# Patient Record
Sex: FEMALE | Race: WHITE
[De-identification: names, ages, dates, MRNs, and addresses within clinical notes are randomized per-mention and may not be internally consistent; named-entity substitution may affect disease eponyms.]

---

## 2024-04-23 ENCOUNTER — HOSPITAL ENCOUNTER (OUTPATIENT)
Dept: HOSPITAL 99 - REG | Age: 79
End: 2024-04-23
Payer: MEDICARE

## 2024-04-23 DIAGNOSIS — J45.30: ICD-10-CM

## 2024-04-23 DIAGNOSIS — E78.00: ICD-10-CM

## 2024-04-23 DIAGNOSIS — R73.9: ICD-10-CM

## 2024-04-23 DIAGNOSIS — K21.9: Primary | ICD-10-CM

## 2024-04-23 DIAGNOSIS — E03.9: ICD-10-CM

## 2024-04-23 DIAGNOSIS — Z85.850: ICD-10-CM

## 2024-04-23 LAB
ALBUMIN SERPL-MCNC: 4.7 G/DL (ref 3.5–5)
ALP SERPL-CCNC: 67 U/L (ref 38–126)
ALT SERPL-CCNC: 12 U/L (ref 0–35)
AST SERPL-CCNC: 27 U/L (ref 14–36)
BUN SERPL-MCNC: 19 MG/DL (ref 7–17)
CALCIUM SERPL-MCNC: 10 MG/DL (ref 8.4–10.2)
CHLORIDE SERPL-SCNC: 103 MMOL/L (ref 98–107)
CO2 SERPL-SCNC: 27 MMOL/L (ref 22–30)
EGFR: > 60
GLUCOSE SERPL-MCNC: 90 MG/DL (ref 70–99)
HBA1C MFR BLD: 5.3 % (ref 4–5.6)
HDLC SERPL-MCNC: 131 MG/DL
LDLC SERPL CALC-MCNC: 115 MG/DL
POTASSIUM SERPL-SCNC: 4.5 MMOL/L (ref 3.5–5.1)
PROT SERPL-MCNC: 7.2 G/DL (ref 6.3–8.2)
SODIUM SERPL-SCNC: 137 MMOL/L (ref 135–145)
VLDLC SERPL CALC-MCNC: 18 MG/DL (ref 0–30)

## 2024-06-03 ENCOUNTER — HOSPITAL ENCOUNTER (OUTPATIENT)
Dept: HOSPITAL 99 - RAD | Age: 79
End: 2024-06-03
Payer: MEDICARE

## 2024-06-03 DIAGNOSIS — J32.9: Primary | ICD-10-CM

## 2024-06-14 ENCOUNTER — HOSPITAL ENCOUNTER (OUTPATIENT)
Dept: HOSPITAL 99 - REG | Age: 79
End: 2024-06-14
Payer: MEDICARE

## 2024-06-14 DIAGNOSIS — R35.0: Primary | ICD-10-CM

## 2024-07-18 ENCOUNTER — HOSPITAL ENCOUNTER (OUTPATIENT)
Dept: HOSPITAL 99 - RAD | Age: 79
End: 2024-07-18
Payer: MEDICARE

## 2024-07-18 DIAGNOSIS — M25.511: Primary | ICD-10-CM

## 2024-07-29 NOTE — PROGRESS NOTES
PT Evaluation     Today's date: 2024  Patient name: Ofelia Connor  : 1945  MRN: [id number]  Referring provider: Jennifer Raya,*  Dx:   Encounter Diagnosis     ICD-10-CM    1. Right shoulder pain, unspecified chronicity  M25.511                  Assessment  Impairments: abnormal or restricted ROM, abnormal movement, activity intolerance, impaired physical strength, lacks appropriate home exercise program, pain with function, poor posture  and poor body mechanics  Functional limitations: click seatbelt, lift overhead, reach backwards, cutting vegetables, disturbed sleep    Assessment details: Ofelia Connor is a 78 y.o. female who presents with pain, decreased strength, decreased ROM, and postural dysfunction. Due to these impairments, patient has difficulty performing ADL's, recreational activities, lifting/carrying, reaching. Patient's clinical presentation is consistent with their referring diagnosis of right shoulder pain. Patient has been educated in home exercise program and plan of care. Patient would benefit from skilled physical therapy services to address their aforementioned functional limitations and progress towards prior level of function and independence with home exercise program.      Prognosis: good  Prognosis details: + factors: high motivation levels  - factors: age     Goals  Short Term Goals to be accomplished by 24:  STG1: Pt will be I with HEP and I with posture management   STG3: Pt will demo pain free full shoulder flexion/abduction AROM to improve self care (showering) and household ADLs (opening car door).  STG4: Pt will demo 4+/5 MMT strength in shoulder to improve reach ability.   STG5: Pt will report pain 5/10 in shoulder at worst.      Long Term Goals to be accomplished by 10/22/24:  LTG1: Pt will be able to click her seat belt without shoulder pain.  LTG2: Pt will be able to lift weight overhead in exercise class without pain.  LTG3: Pt will be able to cut  vegetables for 20-30 minutes without shoulder pain.   LTG4: Pt will deny sleep disturbance due to shoulder pain at night.     Plan  Patient would benefit from: PT eval and skilled physical therapy  Planned modality interventions: cryotherapy, thermotherapy: hydrocollator packs and unattended electrical stimulation    Planned therapy interventions: home exercise program, therapeutic exercise, therapeutic activities, self care, patient education, manual therapy, neuromuscular re-education, joint mobilization, stretching, strengthening and flexibility    Frequency: 2x week  Duration in weeks: 12  Plan of Care beginning date: 2024  Plan of Care expiration date: 10/22/2024  Treatment plan discussed with: patient  Plan details: HEP development, stretching, strengthening, A/AA/PROM, joint mobilizations, posture education, STM/MI as needed to reduce muscle tension, muscle reeducation, PLOC discussed and agreed upon with patient.        Subjective Evaluation    History of Present Illness  Mechanism of injury: Pt is a 77 y/o female who presents to PT with R shoulder pain.  Pain began 4-6 weeks ago.  Has been treating with biofreeze with occasional relief, Advil w/o relief.  Went to her PCP who prescribed PT and x-ray ordered, xray resulst she was uncertain of.   L RTC tear histroy  Patient Goals  Patient goals for therapy: increased motion, decreased pain, increased strength and independence with ADLs/IADLs  Patient goal: click seatbelt, lift overhead, reach backwards, cutting vegetables, disturbed sleep  Pain  Current pain ratin  At best pain ratin  At worst pain ratin  Location: R shoulder  Quality: tight and discomfort  Relieving factors: relaxation and rest  Aggravating factors: overhead activity and lifting    Treatments  Current treatment: physical therapy      Objective     Postural Observations  Seated posture: poor  Standing posture: poor  Correction of posture: has no consistent  effect      Observations     Additional Observation Details  Shoulder level: forward shoulders b/l and forward head    Tenderness     Additional Tenderness Details  TTP over R RTC muscle belly     Active Range of Motion   Left Shoulder   Flexion: WFL  Abduction: WFL  External rotation 0°: WFL    Right Shoulder   Flexion: WFL  Abduction: WFL and with pain  External rotation 0°: WFL and with pain    Strength/Myotome Testing     Left Shoulder     Planes of Motion   Flexion: 4+   Abduction: 4+   External rotation at 0°: 4+   Internal rotation at 0°: 4+     Right Shoulder     Planes of Motion   Flexion: 4+   Abduction: 4-   External rotation at 0°: 4-   Internal rotation at 0°: 4-     Additional Strength Details  Tricep:  R: 4-   L: 4+    Bicep:  R: 4+  L: 4+    Tests     Right Shoulder   Positive Neer's and Speed's.     Additional Tests Details  Spurlings (-) b/l        Precautions:   Standard       Manuals 7/30                                            Neuro Re-Ed 7/30        Scapular retraction 10 sec x 5         Row isometric  GTB 10 sec x 5                                                      Ther Ex 7/30                                                                                Ther Activity 7/30

## 2024-07-30 ENCOUNTER — EVALUATION (OUTPATIENT)
Dept: PHYSICAL THERAPY | Facility: CLINIC | Age: 79
End: 2024-07-30
Payer: MEDICARE

## 2024-07-30 DIAGNOSIS — M25.511 RIGHT SHOULDER PAIN, UNSPECIFIED CHRONICITY: Primary | ICD-10-CM

## 2024-07-30 PROCEDURE — 97162 PT EVAL MOD COMPLEX 30 MIN: CPT | Performed by: PHYSICAL THERAPIST

## 2024-07-30 PROCEDURE — 97110 THERAPEUTIC EXERCISES: CPT | Performed by: PHYSICAL THERAPIST

## 2024-07-30 NOTE — LETTER
2024    Jennifer Raya PA-C  [address] [facility] [address]    Patient: Ofelia Connor   YOB: 1945   Date of Visit: 2024     Encounter Diagnosis     ICD-10-CM    1. Right shoulder pain, unspecified chronicity  M25.511           Dear Dr. Raya:    Thank you for your recent referral of Ofelia Connor. Please review the attached evaluation summary from Ofelia's recent visit.     Please verify that you agree with the plan of care by signing the attached order.     If you have any questions or concerns, please do not hesitate to call.     I sincerely appreciate the opportunity to share in the care of one of your patients and hope to have another opportunity to work with you in the near future.       Sincerely,    Nathanael Sharma, PT      Referring Provider:      I certify that I have read the below Plan of Care and certify the need for these services furnished under this plan of treatment while under my care.                    Jennifer Raya PA-C  [address] [facility] [address]  Via Fax: [phone]          PT Evaluation     Today's date: 2024  Patient name: Ofelia Connor  : 1945  MRN: [id number]  Referring provider: Jennifer Raya,*  Dx:   Encounter Diagnosis     ICD-10-CM    1. Right shoulder pain, unspecified chronicity  M25.511                  Assessment  Impairments: abnormal or restricted ROM, abnormal movement, activity intolerance, impaired physical strength, lacks appropriate home exercise program, pain with function, poor posture  and poor body mechanics  Functional limitations: click seatbelt, lift overhead, reach backwards, cutting vegetables, disturbed sleep    Assessment details: Ofelia Connor is a 78 y.o. female who presents with pain, decreased strength, decreased ROM, and postural dysfunction. Due to these impairments, patient has difficulty performing ADL's, recreational activities, lifting/carrying, reaching. Patient's  clinical presentation is consistent with their referring diagnosis of right shoulder pain. Patient has been educated in home exercise program and plan of care. Patient would benefit from skilled physical therapy services to address their aforementioned functional limitations and progress towards prior level of function and independence with home exercise program.      Prognosis: good  Prognosis details: + factors: high motivation levels  - factors: age     Goals  Short Term Goals to be accomplished by 8/27/24:  STG1: Pt will be I with HEP and I with posture management   STG3: Pt will demo pain free full shoulder flexion/abduction AROM to improve self care (showering) and household ADLs (opening car door).  STG4: Pt will demo 4+/5 MMT strength in shoulder to improve reach ability.   STG5: Pt will report pain 5/10 in shoulder at worst.      Long Term Goals to be accomplished by 10/22/24:  LTG1: Pt will be able to click her seat belt without shoulder pain.  LTG2: Pt will be able to lift weight overhead in exercise class without pain.  LTG3: Pt will be able to cut vegetables for 20-30 minutes without shoulder pain.   LTG4: Pt will deny sleep disturbance due to shoulder pain at night.     Plan  Patient would benefit from: PT eval and skilled physical therapy  Planned modality interventions: cryotherapy, thermotherapy: hydrocollator packs and unattended electrical stimulation    Planned therapy interventions: home exercise program, therapeutic exercise, therapeutic activities, self care, patient education, manual therapy, neuromuscular re-education, joint mobilization, stretching, strengthening and flexibility    Frequency: 2x week  Duration in weeks: 12  Plan of Care beginning date: 7/30/2024  Plan of Care expiration date: 10/22/2024  Treatment plan discussed with: patient  Plan details: HEP development, stretching, strengthening, A/AA/PROM, joint mobilizations, posture education, STM/MI as needed to reduce muscle  tension, muscle reeducation, PLOC discussed and agreed upon with patient.        Subjective Evaluation    History of Present Illness  Mechanism of injury: Pt is a 79 y/o female who presents to PT with R shoulder pain.  Pain began 4-6 weeks ago.  Has been treating with biofreeze with occasional relief, Advil w/o relief.  Went to her PCP who prescribed PT and x-ray ordered, xray resulst she was uncertain of.   L RTC tear histroy  Patient Goals  Patient goals for therapy: increased motion, decreased pain, increased strength and independence with ADLs/IADLs  Patient goal: click seatbelt, lift overhead, reach backwards, cutting vegetables, disturbed sleep  Pain  Current pain ratin  At best pain ratin  At worst pain ratin  Location: R shoulder  Quality: tight and discomfort  Relieving factors: relaxation and rest  Aggravating factors: overhead activity and lifting    Treatments  Current treatment: physical therapy      Objective     Postural Observations  Seated posture: poor  Standing posture: poor  Correction of posture: has no consistent effect      Observations     Additional Observation Details  Shoulder level: forward shoulders b/l and forward head    Tenderness     Additional Tenderness Details  TTP over R RTC muscle belly     Active Range of Motion   Left Shoulder   Flexion: WFL  Abduction: WFL  External rotation 0°: WFL    Right Shoulder   Flexion: WFL  Abduction: WFL and with pain  External rotation 0°: WFL and with pain    Strength/Myotome Testing     Left Shoulder     Planes of Motion   Flexion: 4+   Abduction: 4+   External rotation at 0°: 4+   Internal rotation at 0°: 4+     Right Shoulder     Planes of Motion   Flexion: 4+   Abduction: 4-   External rotation at 0°: 4-   Internal rotation at 0°: 4-     Additional Strength Details  Tricep:  R: 4-   L: 4+    Bicep:  R: 4+  L: 4+    Tests     Right Shoulder   Positive Neer's and Speed's.     Additional Tests Details  Spurlings (-) b/l         Precautions:   Standard       Manuals 8/1                                            Neuro Re-Ed 8/1        Scapular retraction 10 sec x 5         Row isometric  GTB 10 sec x 5                                                      Ther Ex 8/1                                                                                Ther Activity 8/1

## 2024-08-01 ENCOUNTER — OFFICE VISIT (OUTPATIENT)
Dept: PHYSICAL THERAPY | Facility: CLINIC | Age: 79
End: 2024-08-01
Payer: MEDICARE

## 2024-08-01 DIAGNOSIS — M25.511 RIGHT SHOULDER PAIN, UNSPECIFIED CHRONICITY: Primary | ICD-10-CM

## 2024-08-01 PROCEDURE — 97112 NEUROMUSCULAR REEDUCATION: CPT | Performed by: PHYSICAL THERAPIST

## 2024-08-01 PROCEDURE — 97110 THERAPEUTIC EXERCISES: CPT | Performed by: PHYSICAL THERAPIST

## 2024-08-01 PROCEDURE — 97140 MANUAL THERAPY 1/> REGIONS: CPT | Performed by: PHYSICAL THERAPIST

## 2024-08-01 NOTE — PROGRESS NOTES
"Daily Note     Today's date: 2024  Patient name: Ofelia Connor  : 1945  MRN: [id number]  Referring provider: Jennifer Raya,*  Dx:   Encounter Diagnosis     ICD-10-CM    1. Right shoulder pain, unspecified chronicity  M25.511              Subjective: Pt reported that noted that she has been focusing on her posture since IE. Noted that she has been working on scapular retraction, however \"I don't think I have it just yet.\"     Objective: See treatment diary below    Assessment: Tolerated treatment well. Patient demonstrated fatigue post treatment, exhibited good technique with therapeutic exercises, and would benefit from continued PT.    Plan: Continue per plan of care.      Precautions:   Standard       Manuals        STM RTC muscle belly  JZ       AP mob  JZ                         Neuro Re-Ed        Scapular retraction 10 sec x 5  10 sec x 10        Row isometric  GTB 10 sec x 5         Horiz abd sh/elbow 90 90  10 sec x 5                          Educated on posture  5'                Ther Ex        90 90 doorway stretch  :30x3 - v/c slow controlled motion, and cervical positioning        Supine sh flexion  Unable due to pain       Supine pec fly  Shallow ROM 2x10        Supine serratus punch  2x10 towel roll under elbow for rest in between sets        Supine punch/CP  0# 2x10  1# 2x10 (neutral to prontaed )   2# 2x10  towel roll under elbow for rest in between sets                                   Ther Activity                                                                                     "

## 2024-08-06 ENCOUNTER — OFFICE VISIT (OUTPATIENT)
Dept: PHYSICAL THERAPY | Facility: CLINIC | Age: 79
End: 2024-08-06
Payer: MEDICARE

## 2024-08-06 DIAGNOSIS — M25.511 RIGHT SHOULDER PAIN, UNSPECIFIED CHRONICITY: Primary | ICD-10-CM

## 2024-08-06 PROCEDURE — 97140 MANUAL THERAPY 1/> REGIONS: CPT | Performed by: PHYSICAL THERAPIST

## 2024-08-06 PROCEDURE — 97112 NEUROMUSCULAR REEDUCATION: CPT | Performed by: PHYSICAL THERAPIST

## 2024-08-06 PROCEDURE — 97110 THERAPEUTIC EXERCISES: CPT | Performed by: PHYSICAL THERAPIST

## 2024-08-06 NOTE — PROGRESS NOTES
Daily Note     Today's date: 2024  Patient name: Ofelia Connor  : 1945  MRN: [id number]  Referring provider: Jennifer Raya,*  Dx:   Encounter Diagnosis     ICD-10-CM    1. Right shoulder pain, unspecified chronicity  M25.511              Subjective: Pt reported that she felt a little increase in soreness following the last session, however she found a helpful topical ointment that has helped with pain control.     Objective: See treatment diary below    Assessment: Tolerated treatment well. Patient demonstrated fatigue post treatment, exhibited good technique with therapeutic exercises, and would benefit from continued PT    Plan: Continue per plan of care.      Precautions:   Standard       Manuals       STM RTC muscle belly  JZ KALIE      AP mob  KALIE JADE                        Neuro Re-Ed       Scapular retraction 10 sec x 5  10 sec x 10        Row isometric  GTB 10 sec x 5         Horiz abd sh/elbow 90 90  10 sec x 5        S/l sh ER   0# 10x  1# 2x10       S/l sh abduction   0# 10x   1# 10x  2# 10x      S/l sh flexion   0# 10x  1# 10x   2# 10x       Educated on posture  5'                Ther Ex       90 90 doorway stretch  :30x3 - v/c slow controlled motion, and cervical positioning        Supine sh flexion  Unable due to pain       Supine pec fly  Shallow ROM 2x10  0# 10x   1# 2x10  2# 2x10       Supine serratus punch  2x10 towel roll under elbow for rest in between sets  1# 2x10  2# 2x10        Supine punch/CP  0# 2x10  1# 2x10 (neutral to prontaed )   2# 2x10  towel roll under elbow for rest in between sets  1# 2x10   2# 2x10  3# 2x10   (neutral to prontaed )   towel roll under elbow for rest in between sets       Seated sh/elbow 90 90 - ER/IR db AROM - elbow on elevated HOB   0# 10x  1# 10x  2# 10x                         Ther Activity

## 2024-08-17 ENCOUNTER — HOSPITAL ENCOUNTER (OUTPATIENT)
Dept: HOSPITAL 99 - WDC | Age: 79
End: 2024-08-17
Payer: MEDICARE

## 2024-08-17 DIAGNOSIS — Z12.31: Primary | ICD-10-CM

## 2024-08-19 ENCOUNTER — APPOINTMENT (OUTPATIENT)
Dept: PHYSICAL THERAPY | Facility: CLINIC | Age: 79
End: 2024-08-19
Payer: MEDICARE

## 2024-08-21 ENCOUNTER — APPOINTMENT (OUTPATIENT)
Dept: PHYSICAL THERAPY | Facility: CLINIC | Age: 79
End: 2024-08-21
Payer: MEDICARE

## 2024-08-22 ENCOUNTER — APPOINTMENT (OUTPATIENT)
Dept: PHYSICAL THERAPY | Facility: CLINIC | Age: 79
End: 2024-08-22
Payer: MEDICARE

## 2024-08-26 ENCOUNTER — OFFICE VISIT (OUTPATIENT)
Dept: PHYSICAL THERAPY | Facility: CLINIC | Age: 79
End: 2024-08-26
Payer: MEDICARE

## 2024-08-26 DIAGNOSIS — M25.511 RIGHT SHOULDER PAIN, UNSPECIFIED CHRONICITY: Primary | ICD-10-CM

## 2024-08-26 PROCEDURE — 97110 THERAPEUTIC EXERCISES: CPT | Performed by: PHYSICAL THERAPIST

## 2024-08-26 PROCEDURE — 97112 NEUROMUSCULAR REEDUCATION: CPT | Performed by: PHYSICAL THERAPIST

## 2024-08-26 PROCEDURE — 97140 MANUAL THERAPY 1/> REGIONS: CPT | Performed by: PHYSICAL THERAPIST

## 2024-08-26 NOTE — PROGRESS NOTES
Daily Note     Today's date: 2024  Patient name: Ofelia Connor  : 1945  MRN: [id number]  Referring provider: Jennifer Raya,*  Dx:   Encounter Diagnosis     ICD-10-CM    1. Right shoulder pain, unspecified chronicity  M25.511                Subjective: Pt reported that she has been improving significantly since starting PT. Stated that her pain frequency and severity has been decreased significantly.     Objective: See treatment diary below    Assessment: Tolerated treatment well. Patient demonstrated fatigue post treatment, exhibited good technique with therapeutic exercises, and would benefit from continued PT    Plan: Continue per plan of care.      Precautions:   Standard       Manuals      STM RTC muscle belly  JZ JZ JZ     AP mob  JZ JZ JZ                       Neuro Re-Ed      Scapular retraction 10 sec x 5  10 sec x 10        Row isometric  GTB 10 sec x 5         Horiz abd sh/elbow 90 90  10 sec x 5        S/l sh ER   0# 10x  1# 2x10  1# 2x10   2# x10 pain     S/l sh abduction   0# 10x   1# 10x  2# 10x 1# 2x10   2# 2x10        S/l sh flexion   0# 10x  1# 10x   2# 10x  1# 2x10  2# 2x10      Overhead press    1# 2x10               Ther Ex      90 90 doorway stretch  :30x3 - v/c slow controlled motion, and cervical positioning        Supine sh flexion  Unable due to pain       Supine pec fly  Shallow ROM 2x10  0# 10x   1# 2x10  2# 2x10  1# 2x10  2# 2x10     Supine serratus punch  2x10 towel roll under elbow for rest in between sets  1# 2x10  2# 2x10   Held      Supine punch/CP  0# 2x10  1# 2x10 (neutral to prontaed )   2# 2x10  towel roll under elbow for rest in between sets  1# 2x10   2# 2x10  3# 2x10   (neutral to prontaed )   towel roll under elbow for rest in between sets  1# 2x10   2# 2x10   3# 2x10      Seated sh/elbow 90 90 - ER/IR db AROM - elbow on elevated HOB   0# 10x  1# 10x  2# 10x  1# 2x10   2# 2x10      TB shoulder IR  at 0'    O 2x10 frequent v/c form               Ther Activity 7/30 8/1 8/6 8/26

## 2024-08-28 ENCOUNTER — APPOINTMENT (OUTPATIENT)
Dept: PHYSICAL THERAPY | Facility: CLINIC | Age: 79
End: 2024-08-28
Payer: MEDICARE

## 2024-09-04 ENCOUNTER — OFFICE VISIT (OUTPATIENT)
Dept: PHYSICAL THERAPY | Facility: CLINIC | Age: 79
End: 2024-09-04
Payer: MEDICARE

## 2024-09-04 DIAGNOSIS — M25.511 RIGHT SHOULDER PAIN, UNSPECIFIED CHRONICITY: Primary | ICD-10-CM

## 2024-09-04 PROCEDURE — 97110 THERAPEUTIC EXERCISES: CPT

## 2024-09-04 PROCEDURE — 97140 MANUAL THERAPY 1/> REGIONS: CPT

## 2024-09-04 PROCEDURE — 97112 NEUROMUSCULAR REEDUCATION: CPT

## 2024-09-04 NOTE — PROGRESS NOTES
Daily Note     Today's date: 2024  Patient name: Ofelia Connor  : 1945  MRN: [id number]  Referring provider: Jennifer Raya,*  Dx:   Encounter Diagnosis     ICD-10-CM    1. Right shoulder pain, unspecified chronicity  M25.511         Start Time: 1045  Stop Time: 1130  Total time in clinic (min): 45 minutes    Subjective: Ofelia reports improvement in R shoulder pain as PT sessions progress. Pt has been participating in jazzercise classes weekly, modifying weight if irritability occurs. Notes most discomfort occurring after sleeping on R side, improves with activity throughout the day.     Objective: See treatment diary below    Assessment: Ofelia tolerated PT treatment well. Continued with STM to R RTC musculature to address tissue tone, minimal TTP reported. Completed RTC strengthening without provocation, gradually advancing DB resistance. Intermittent cueing required for proper exercise technique throughout session. Pt would benefit from continued PT to further address impairments and maximize functional level.    Plan: Continue per plan of care.      Precautions:   Standard       Manuals     STM RTC muscle belly  JZ JZ JZ JW    AP mob  JZ JZ JZ JW OP                      Neuro Re-Ed     Scapular retraction 10 sec x 5  10 sec x 10        Row isometric  GTB 10 sec x 5         Horiz abd sh/elbow 90 90  10 sec x 5        S/l sh ER   0# 10x  1# 2x10  1# 2x10   2# x10 pain 1# 2x15     S/l sh abduction   0# 10x   1# 10x  2# 10x 1# 2x10   2# 2x10    1# 2x10   2# 2x10     S/l sh flexion   0# 10x  1# 10x   2# 10x  1# 2x10  2# 2x10  1# 2x10   2# 2x10     Overhead press    1# 2x10  1# 2x10              Ther Ex     90 90 doorway stretch  :30x3 - v/c slow controlled motion, and cervical positioning        Supine sh flexion  Unable due to pain       Supine pec fly  Shallow ROM 2x10  0# 10x   1# 2x10  2# 2x10  1# 2x10  2# 2x10 1# 2x10  2# 2x10     Supine serratus punch  2x10 towel roll under elbow for rest in between sets  1# 2x10  2# 2x10   Held      Supine punch/CP  0# 2x10  1# 2x10 (neutral to prontaed )   2# 2x10  towel roll under elbow for rest in between sets  1# 2x10   2# 2x10  3# 2x10   (neutral to prontaed )   towel roll under elbow for rest in between sets  1# 2x10   2# 2x10   3# 2x10  1# 2x10   2# 2x10   3# 2x10     Seated sh/elbow 90 90 - ER/IR db AROM - elbow on elevated HOB   0# 10x  1# 10x  2# 10x  1# 2x10   2# 2x10  1# 2x10   2# 2x10     TB shoulder IR at 0'    O 2x10 frequent v/c form  O 2x10 frequent v/c form              Ther Activity 7/30 8/1 8/6 8/26 9/4

## 2024-09-06 ENCOUNTER — OFFICE VISIT (OUTPATIENT)
Dept: PHYSICAL THERAPY | Facility: CLINIC | Age: 79
End: 2024-09-06
Payer: MEDICARE

## 2024-09-06 DIAGNOSIS — M25.511 RIGHT SHOULDER PAIN, UNSPECIFIED CHRONICITY: Primary | ICD-10-CM

## 2024-09-06 PROCEDURE — 97140 MANUAL THERAPY 1/> REGIONS: CPT | Performed by: PHYSICAL THERAPIST

## 2024-09-06 PROCEDURE — 97110 THERAPEUTIC EXERCISES: CPT | Performed by: PHYSICAL THERAPIST

## 2024-09-06 NOTE — PROGRESS NOTES
Daily Note     Today's date: 2024  Patient name: Ofelia Connor  : 1945  MRN: [id number]  Referring provider: Jennifer Raya,*  Dx:   Encounter Diagnosis     ICD-10-CM    1. Right shoulder pain, unspecified chronicity  M25.511              Subjective: Pt reported 1/10 pain upon arriving today. Noted that she has been still having difficulty with disturbed sleep due to shoulder pain and notes some persisting overhead reaching pain. Stated that she has improved about 50-60% GROC since starting PT.     Objective: See treatment diary below    Assessment: Tolerated treatment well. Patient demonstrated fatigue post treatment, exhibited good technique with therapeutic exercises, and would benefit from continued PT    Plan: Continue per plan of care.      Precautions:   Standard       Manuals    STM RTC muscle belly  JZ JZ JZ JW JZ   AP mob  JZ JZ JZ JW OP JZ - and inf glide                      Neuro Re-Ed    Scapular retraction 10 sec x 5  10 sec x 10        Row isometric  GTB 10 sec x 5         Horiz abd sh/elbow 90 90 - back to wall  10 sec x 5     2x10                      S/l sh ER   0# 10x  1# 2x10  1# 2x10   2# x10 pain 1# 2x15     S/l sh abduction   0# 10x   1# 10x  2# 10x 1# 2x10   2# 2x10    1# 2x10   2# 2x10     S/l sh flexion   0# 10x  1# 10x   2# 10x  1# 2x10  2# 2x10  1# 2x10   2# 2x10     Overhead press    1# 2x10  1# 2x10  2# 10x (R sh pain)  1# 2x10 v/c for slow controlled motion, and to fully extend elbow             Ther Ex    90 90 doorway stretch  :30x3 - v/c slow controlled motion, and cervical positioning     :30x3    Supine sh flexion  Unable due to pain       Supine pec fly  Shallow ROM 2x10  0# 10x   1# 2x10  2# 2x10  1# 2x10  2# 2x10 1# 2x10  2# 2x10    Posterior capsule stretch      :30x3    Supine punch/CP  0# 2x10  1# 2x10 (neutral to prontaed )   2# 2x10  towel roll under elbow for rest in  between sets  1# 2x10   2# 2x10  3# 2x10   (neutral to prontaed )   towel roll under elbow for rest in between sets  1# 2x10   2# 2x10   3# 2x10  1# 2x10   2# 2x10   3# 2x10     Seated sh/elbow 90 90 - ER/IR db AROM - elbow on elevated HOB   0# 10x  1# 10x  2# 10x  1# 2x10   2# 2x10  1# 2x10   2# 2x10     TB shoulder IR at 0'    O 2x10 frequent v/c form  O 2x10 frequent v/c form  O 3x10    U/l lat stretch      :30x3    TB sh ER at side      O 3x10    Reviewed and educated on HEP      10'             Ther Activity 7/30 8/1 8/6 8/26 9/4 9/6

## 2024-09-11 ENCOUNTER — OFFICE VISIT (OUTPATIENT)
Dept: PHYSICAL THERAPY | Facility: CLINIC | Age: 79
End: 2024-09-11
Payer: MEDICARE

## 2024-09-11 ENCOUNTER — HOSPITAL ENCOUNTER (OUTPATIENT)
Dept: HOSPITAL 99 - REG | Age: 79
End: 2024-09-11
Payer: MEDICARE

## 2024-09-11 DIAGNOSIS — E89.0: Primary | ICD-10-CM

## 2024-09-11 DIAGNOSIS — M25.511 RIGHT SHOULDER PAIN, UNSPECIFIED CHRONICITY: Primary | ICD-10-CM

## 2024-09-11 LAB — TSH SERPL DL<=0.05 MIU/L-ACNC: 5.68 UIU/ML (ref 0.47–4.68)

## 2024-09-11 PROCEDURE — 97112 NEUROMUSCULAR REEDUCATION: CPT

## 2024-09-11 PROCEDURE — 97140 MANUAL THERAPY 1/> REGIONS: CPT

## 2024-09-11 PROCEDURE — 97110 THERAPEUTIC EXERCISES: CPT

## 2024-09-11 NOTE — PROGRESS NOTES
Daily Note     Today's date: 2024  Patient name: Ofelia Connor  : 1945  MRN: [id number]  Referring provider: Jennifer Raya,*  Dx:   Encounter Diagnosis     ICD-10-CM    1. Right shoulder pain, unspecified chronicity  M25.511              Subjective: Ofelia reports R shoulder continues to be most bothersome with sleep, minimal provocation throughout daily activities. Able to complete most exercises in jazzercise class.     Objective: See treatment diary below    Assessment: Ofelia tolerated PT treatment well. Continues to benefit from STM to RTC musculature to address tissue mobility, Requires intermittent cues for proper exercise technique and slow/controlled motion. Denied shoulder pain throughout and post session. Pt would benefit from continued PT to further address impairments and maximize functional level.    Plan: Continue per plan of care.      Precautions:   Standard       Manuals    STM RTC muscle belly JW  JZ JZ JW JZ   AP mob JW OP  JZ JZ JW OP JZ - and inf glide                      Neuro Re-Ed    Scapular retraction         Row isometric          Horiz abd sh/elbow 90 90 - back to wall 2x10      2x10                      S/l sh ER   0# 10x  1# 2x10  1# 2x10   2# x10 pain 1# 2x15     S/l sh abduction   0# 10x   1# 10x  2# 10x 1# 2x10   2# 2x10    1# 2x10   2# 2x10     S/l sh flexion   0# 10x  1# 10x   2# 10x  1# 2x10  2# 2x10  1# 2x10   2# 2x10     Overhead press 1# 2x10    1# 2x10  1# 2x10  2# 10x (R sh pain)  1# 2x10 v/c for slow controlled motion, and to fully extend elbow             Ther Ex    90 90 doorway stretch :30x3 ea     :30x3    Supine sh flexion         Supine pec fly   0# 10x   1# 2x10  2# 2x10  1# 2x10  2# 2x10 1# 2x10  2# 2x10    Posterior capsule stretch :30x3      :30x3    Supine punch/CP   1# 2x10   2# 2x10  3# 2x10   (neutral to prontaed )   towel roll under elbow for rest in between sets  1# 2x10    2# 2x10   3# 2x10  1# 2x10   2# 2x10   3# 2x10     Seated sh/elbow 90 90 - ER/IR db AROM - elbow on elevated HOB 1# 2x10   2# 2x10   0# 10x  1# 10x  2# 10x  1# 2x10   2# 2x10  1# 2x10   2# 2x10     TB shoulder IR at 0' OTB 3x10    O 2x10 frequent v/c form  O 2x10 frequent v/c form  O 3x10    U/l lat stretch :30x3 ea      :30x3    TB sh ER at side OTB 3x10     O 3x10    Reviewed and educated on HEP      10'             Ther Activity 9/11 8/6 8/26 9/4 9/6

## 2024-09-13 ENCOUNTER — OFFICE VISIT (OUTPATIENT)
Dept: PHYSICAL THERAPY | Facility: CLINIC | Age: 79
End: 2024-09-13
Payer: MEDICARE

## 2024-09-13 DIAGNOSIS — M25.511 RIGHT SHOULDER PAIN, UNSPECIFIED CHRONICITY: Primary | ICD-10-CM

## 2024-09-13 PROCEDURE — 97140 MANUAL THERAPY 1/> REGIONS: CPT | Performed by: PHYSICAL THERAPIST

## 2024-09-13 PROCEDURE — 97110 THERAPEUTIC EXERCISES: CPT | Performed by: PHYSICAL THERAPIST

## 2024-09-13 PROCEDURE — 97112 NEUROMUSCULAR REEDUCATION: CPT | Performed by: PHYSICAL THERAPIST

## 2024-09-13 NOTE — PROGRESS NOTES
Daily Note     Today's date: 2024  Patient name: Ofelia Connor  : 1945  MRN: [id number]  Referring provider: Jennifer Raya,*  Dx:   Encounter Diagnosis     ICD-10-CM    1. Right shoulder pain, unspecified chronicity  M25.511              Subjective: Pt reported that she feels like she has been beginning to progress again since advancing her exercises.     Objective: See treatment diary below    Assessment: Tolerated treatment well. Demonstrated good motion with manual treatment. Advanced exercises today well without increased pain. Patient demonstrated fatigue post treatment, exhibited good technique with therapeutic exercises, and would benefit from continued PT    Plan: Continue per plan of care.      Precautions:   Standard       Manuals    STM RTC muscle belly JW JZ  JZ JW JZ   AP mob JW OP JZ and inf glide   JZ JW OP JZ - and inf glide                      Neuro Re-Ed    Scapular retraction         Row isometric          Horiz abd sh/elbow 90 90 - back to wall 2x10  2x10     2x10             Seated DB punch   1# 3x10        S/l sh ER    1# 2x10   2# x10 pain 1# 2x15     Seated sh abduction  10x   1# 2x10        S/l sh flexion    1# 2x10  2# 2x10  1# 2x10   2# 2x10     Overhead press 1# 2x10  1# 10x easy  2# 10x easy  3# 10x good weight  1# 2x10  1# 2x10  2# 10x (R sh pain)  1# 2x10 v/c for slow controlled motion, and to fully extend elbow             Ther Ex    90 90 doorway stretch :30x3 ea :30x2     :30x3    Supine sh flexion         Supine pec fly    1# 2x10  2# 2x10 1# 2x10  2# 2x10    Posterior capsule stretch :30x3  :30x2     :30x3    Supine punch/CP    1# 2x10   2# 2x10   3# 2x10  1# 2x10   2# 2x10   3# 2x10     Seated sh/elbow 90 90 - ER/IR db AROM - elbow on elevated HOB 1# 2x10   2# 2x10    1# 2x10   2# 2x10  1# 2x10   2# 2x10     TB shoulder IR at 0' OTB 3x10  OTB 3x10   O 2x10 frequent v/c form  O 2x10  frequent v/c form  O 3x10    U/l lat stretch :30x3 ea  :30x2 ea    :30x3    TB sh ER at side OTB 3x10 OTB 3x10     O 3x10    Reviewed and educated on HEP      10'             Ther Activity 9/11 9/13 8/26 9/4 9/6

## 2024-09-14 LAB
THYROGLOB AB SERPL-ACNC: <0.9 IU/ML (ref 0–4)
THYROGLOB SERPL-MCNC: <0.1 NG/ML (ref 1.3–31.8)

## 2024-09-16 ENCOUNTER — OFFICE VISIT (OUTPATIENT)
Dept: PHYSICAL THERAPY | Facility: CLINIC | Age: 79
End: 2024-09-16
Payer: MEDICARE

## 2024-09-16 DIAGNOSIS — M25.511 RIGHT SHOULDER PAIN, UNSPECIFIED CHRONICITY: Primary | ICD-10-CM

## 2024-09-16 PROCEDURE — 97110 THERAPEUTIC EXERCISES: CPT

## 2024-09-16 PROCEDURE — 97140 MANUAL THERAPY 1/> REGIONS: CPT

## 2024-09-16 PROCEDURE — 97112 NEUROMUSCULAR REEDUCATION: CPT

## 2024-09-16 NOTE — PROGRESS NOTES
Daily Note     Today's date: 2024  Patient name: Ofelia Connor  : 1945  MRN: [id number]  Referring provider: Jennifer Raya,*  Dx:   Encounter Diagnosis     ICD-10-CM    1. Right shoulder pain, unspecified chronicity  M25.511              Subjective: Ofelia reports decreased R shoulder pain, able to participate in jazzercise classes without provocation. Notes gradual improvement in UE strength as well.     Objective: See treatment diary below    Assessment: Ofelia tolerated PT treatment well. Little TTP and improved tissue mobility in RTC with soft tissue mobilization. Pt was able to complete all interventions today without any limitations except fatigue. Cues required for proper technique with TB IR, improved for following. Pt t be D/C to HEP and independent exercises NV.         Plan: Continue per plan of care.      Precautions:   Standard       Manuals    STM RTC muscle belly JW JZ JW  JW JZ   AP mob JW OP JZ and inf glide  JW OP  JW OP JZ - and inf glide                      Neuro Re-Ed    Scapular retraction         Row isometric          Horiz abd sh/elbow 90 90 - back to wall 2x10  2x10  2x10   2x10             Seated DB punch   1# 3x10  1# 3x10       S/l sh ER     1# 2x15     Seated sh abduction  10x   1# 2x10  Standing 2# 3x10       S/l sh flexion     1# 2x10   2# 2x10     Overhead press 1# 2x10  1# 10x easy  2# 10x easy  3# 10x good weight 2# x10   3# 2x10   1# 2x10  2# 10x (R sh pain)  1# 2x10 v/c for slow controlled motion, and to fully extend elbow             Ther Ex    90 90 doorway stretch :30x3 ea :30x2  :30x2   :30x3    Supine sh flexion         Supine pec fly     1# 2x10  2# 2x10    Posterior capsule stretch :30x3  :30x2  :30x2    :30x3    Supine punch/CP     1# 2x10   2# 2x10   3# 2x10     Seated sh/elbow 90 90 - ER/IR db AROM - elbow on elevated HOB 1# 2x10   2# 2x10     1# 2x10   2# 2x10     TB shoulder IR at 0' OTB  3x10  OTB 3x10  OTB 3x10   O 2x10 frequent v/c form  O 3x10    U/l lat stretch :30x3 ea  :30x2 ea :30x2    :30x3    TB sh ER at side OTB 3x10 OTB 3x10  OTB 3x10    O 3x10    Reviewed and educated on HEP      10'             Ther Activity 9/11 9/13 9/16 9/4 9/6

## 2024-09-18 ENCOUNTER — OFFICE VISIT (OUTPATIENT)
Dept: PHYSICAL THERAPY | Facility: CLINIC | Age: 79
End: 2024-09-18
Payer: MEDICARE

## 2024-09-18 DIAGNOSIS — M25.511 RIGHT SHOULDER PAIN, UNSPECIFIED CHRONICITY: Primary | ICD-10-CM

## 2024-09-18 PROCEDURE — 97112 NEUROMUSCULAR REEDUCATION: CPT

## 2024-09-18 PROCEDURE — 97110 THERAPEUTIC EXERCISES: CPT

## 2024-09-18 PROCEDURE — 97140 MANUAL THERAPY 1/> REGIONS: CPT

## 2024-09-18 NOTE — PROGRESS NOTES
Daily Note     Today's date: 2024  Patient name: Ofelia Connor  : 1945  MRN: [id number]  Referring provider: Jennifer Raya,*  Dx:   Encounter Diagnosis     ICD-10-CM    1. Right shoulder pain, unspecified chronicity  M25.511              Subjective: Ofelia reports she feels shoulder has improved about 90%, thinks she can regain 10% independently with HEP and jazzercise classes.     Objective: See treatment diary below    Assessment: Ofelia tolerated PT treatment well. Pt displays independence with all exercises, reports no R shoulder pain/discomfort, and has met all goals for physical therapy pt will be D/C to HEP per PT review.     Plan: Pt will be D/C to HEP pending PT review.      Precautions:   Standard       Manuals    STM RTC muscle belly JW JZ JW JW   JZ   AP mob JW OP JZ and inf glide  JW OP JW OP  JZ - and inf glide                      Neuro Re-Ed    Scapular retraction         Row isometric          Horiz abd sh/elbow 90 90 - back to wall 2x10  2x10  2x10 2x10   2x10             Seated DB punch   1# 3x10  1# 3x10  1# 3x10      S/l sh ER         Seated sh abduction  10x   1# 2x10  Standing 2# 3x10  3# 3x10     S/l sh flexion         Overhead press 1# 2x10  1# 10x easy  2# 10x easy  3# 10x good weight 2# x10   3# 2x10  3# 3x10  2# 10x (R sh pain)  1# 2x10 v/c for slow controlled motion, and to fully extend elbow             Ther Ex    90 90 doorway stretch :30x3 ea :30x2  :30x2 :30x3 ea  :30x3    Supine sh flexion         Supine pec fly         Posterior capsule stretch :30x3  :30x2  :30x2  :30x3 ea  :30x3    Supine punch/CP         Seated sh/elbow 90 90 - ER/IR db AROM - elbow on elevated HOB 1# 2x10   2# 2x10         TB shoulder IR at 0' OTB 3x10  OTB 3x10  OTB 3x10  OTB 3x10  O 3x10    U/l lat stretch :30x3 ea  :30x2 ea :30x2  :30x3   :30x3    TB sh ER at side OTB 3x10 OTB 3x10  OTB 3x10  OTB 3x10   O 3x10    Reviewed and  educated on HEP      10'             Ther Activity 9/11 9/13 9/16 9/18 9/6

## 2025-06-19 ENCOUNTER — EVALUATION (OUTPATIENT)
Dept: PHYSICAL THERAPY | Facility: CLINIC | Age: 80
End: 2025-06-19
Payer: MEDICARE

## 2025-06-19 ENCOUNTER — HOSPITAL ENCOUNTER (OUTPATIENT)
Dept: HOSPITAL 99 - REG | Age: 80
End: 2025-06-19
Payer: MEDICARE

## 2025-06-19 DIAGNOSIS — Z00.00: ICD-10-CM

## 2025-06-19 DIAGNOSIS — R73.9: ICD-10-CM

## 2025-06-19 DIAGNOSIS — S29.012D UPPER BACK STRAIN, SUBSEQUENT ENCOUNTER: Primary | ICD-10-CM

## 2025-06-19 DIAGNOSIS — Z79.899: ICD-10-CM

## 2025-06-19 DIAGNOSIS — E03.9: Primary | ICD-10-CM

## 2025-06-19 DIAGNOSIS — E78.00: ICD-10-CM

## 2025-06-19 DIAGNOSIS — R53.82: ICD-10-CM

## 2025-06-19 LAB
ALBUMIN SERPL-MCNC: 4.2 G/DL (ref 3.5–5)
ALP SERPL-CCNC: 59 U/L (ref 38–126)
ALT SERPL-CCNC: 16 U/L (ref 0–35)
AST SERPL-CCNC: 26 U/L (ref 14–36)
BASOPHILS # BLD AUTO: 0.1 10^3/UL (ref 0–0.2)
BASOPHILS NFR BLD AUTO: 1 % (ref 0–2)
BILIRUB SERPL-MCNC: 0.5 MG/DL (ref 0.2–1.3)
BUN SERPL-MCNC: 20 MG/DL (ref 7–17)
CALCIUM SERPL-MCNC: 9.4 MG/DL (ref 8.4–10.2)
CHLORIDE SERPL-SCNC: 108 MMOL/L (ref 98–107)
CHOLEST SERPL-MCNC: 234 MG/DL (ref 50–199)
CO2 SERPL-SCNC: 30 MMOL/L (ref 22–30)
COMMENT: (no result)
CREAT SERPL-MCNC: 0.8 MG/DL (ref 0.6–1)
EGFR: > 60
EOSINOPHIL # BLD AUTO: 0.2 10^3/UL (ref 0–0.7)
EOSINOPHIL NFR BLD AUTO: 3.1 % (ref 0–6)
ERYTHROCYTE [DISTWIDTH] IN BLOOD BY AUTOMATED COUNT: 13 % (ref 11.5–14.5)
ESTIMATED CREATININE CLEARANCE: (no result) ML/MIN
GLUCOSE SERPL-MCNC: 89 MG/DL (ref 70–99)
HBA1C MFR BLD: 5.2 % (ref 4–5.6)
HCT VFR BLD AUTO: 44.9 % (ref 37–47)
HDLC SERPL-MCNC: 77 MG/DL
HGB BLD-MCNC: 14.9 G/DL (ref 12–16)
IMM GRANULOCYTES # BLD AUTO: 0.1 10^3/UL (ref 0–0.05)
IMM GRANULOCYTES NFR BLD AUTO: 1.5 % (ref 0–0.5)
LDLC SERPL CALC-MCNC: 141 MG/DL
LYMPHOCYTES # BLD AUTO: 1.4 10^3/UL (ref 1.2–3.4)
LYMPHOCYTES NFR BLD AUTO: 20.4 % (ref 20.5–51.1)
MCH RBC QN AUTO: 31 PG (ref 27–31)
MCHC RBC AUTO-ENTMCNC: 33.2 G/DL (ref 33–37)
MCV RBC AUTO: 93.5 FL (ref 81–99)
MONOCYTES # BLD AUTO: 0.5 10^3/UL (ref 0.1–0.6)
MONOCYTES NFR BLD AUTO: 7.6 % (ref 1.7–9.3)
NEUTROPHILS # BLD AUTO: 4.5 10^3/UL (ref 1.4–6.5)
NEUTROPHILS NFR BLD AUTO: 66.4 % (ref 42.2–75.2)
NRBC BLD AUTO-RTO: 0 %
PLATELET # BLD AUTO: 272 10^3/UL (ref 130–400)
PMV BLD AUTO: 11.5 FL (ref 7.4–10.4)
POTASSIUM SERPL-SCNC: 4.4 MMOL/L (ref 3.5–5.1)
PROT SERPL-MCNC: 6.6 G/DL (ref 6.3–8.2)
RBC # BLD AUTO: 4.8 10^6/UL (ref 4.2–5.4)
SODIUM SERPL-SCNC: 141 MMOL/L (ref 135–145)
TRIGL SERPL-MCNC: 81 MG/DL (ref 10–149)
TSH SERPL DL<=0.05 MIU/L-ACNC: 4.15 UIU/ML (ref 0.47–4.68)
VLDLC SERPL CALC-MCNC: 16 MG/DL (ref 0–30)
WBC # BLD AUTO: 6.7 10^3/UL (ref 4.8–10.8)

## 2025-06-19 PROCEDURE — 97112 NEUROMUSCULAR REEDUCATION: CPT

## 2025-06-19 PROCEDURE — 97161 PT EVAL LOW COMPLEX 20 MIN: CPT

## 2025-06-19 NOTE — PROGRESS NOTES
PT Evaluation     Today's date: 2025  Patient name: Ofelia Connor  : 1945  MRN: [id number]  Referring provider: Hiral Bunn,*  Dx:   Encounter Diagnosis     ICD-10-CM    1. Upper back strain, subsequent encounter  S29.012D                      Assessment  Impairments: abnormal muscle firing, abnormal or restricted ROM, activity intolerance, impaired physical strength, lacks appropriate home exercise program, pain with function, scapular dyskinesis, poor posture  and poor body mechanics    Assessment details: Ofelia Connor is a 79 y.o. female who presents to hospital-based outpatient PT with posterior neck and upper back pain. Patient presents with the following impairments: neck pain, increased tissue tension of R UT/LS, cervical paraspinals, and abnormal posture. Due to these impairments, patient has difficulty performing the following: looking up, looking down, turning head right, lying supine, and sleeping. Patient has been educated in home exercise program and plan of care. Patient would benefit from skilled physical therapy services to address the above functional limitations and progress towards prior level of function and independence with home exercise program.  Understanding of Dx/Px/POC: good     Prognosis: good    Goals  Short Term Goals [3 weeks; target date: 25]  1. Patient will be independent with initial HEP.  2. Patient will be able to self-correct posture in the clinic 50% of the time or greater.    Long Term Goals [8 weeks; target date: 25]  1. Patient will demonstrate an increase in cervical AROM to WNL in order to promote self-care activities pain-free.  2. Patient will present without sleep interruption on 5 out of 7 nights due to neck/back pain.   3. Patient will be able to self-correct posture in the clinic 75% of the time or greater.  4. Patient will be independent with comprehensive HEP.  5. Patient will return to [facility] 4x/week with neck/back pain of 2/10  at worst.     Plan  Patient would benefit from: skilled physical therapy  Planned modality interventions: cryotherapy, electrical stimulation/Russian stimulation, TENS, ultrasound, thermotherapy: hydrocollator packs, traction, high voltage pulsed current: spasm management and high voltage pulsed current: pain management    Planned therapy interventions: flexibility, functional ROM exercises, graded exercise, home exercise program, joint mobilization, manual therapy, neuromuscular re-education, patient education, strengthening, stretching, therapeutic exercise, therapeutic activities, balance/weight bearing training, gait training, abdominal trunk stabilization, self care, postural training, activity modification, ADL retraining, ADL training, balance, behavior modification, body mechanics training, breathing training, therapeutic training, transfer training, graded activity, graded motor, muscle pump exercises, Brady taping and IADL retraining    Frequency: 1-2x week  Plan of Care beginning date: 2025  Plan of Care expiration date: 2025  Treatment plan discussed with: patient    Subjective Evaluation    History of Present Illness  Mechanism of injury: Pt reports tightness in the muscles of the back neck over the past 1.5 months. Pt denies a specific incident that started this. Pt states that she may attribute this to walking while looking down. Pt states that she has also gotten a Sleep Number bed in May, which has helped. Pt states that she has interrupted sleep and pain at night. Pt states that she also has restriction in turning her head. Pt states the pain is located on the right side of the neck, but denies pain in the arm or headaches. Pt states she mentioned this to her PCP during an annual physical and was referred to PT.  Patient Goals  Patient goal: To get muscles to relax and less tight  Pain  Current pain ratin  At best pain ratin  At worst pain ratin  Location: Upper  thoracic  Quality: tight  Relieving factors: medications  Aggravating factors: lifting (Walking uphill while holding a weighted bag)  Progression: no change    Social Support  Lives with: spouse    Employment status: not working  Hand dominance: right  Exercise history: Jazzercise 4x/week prior; less recently      Diagnostic Tests  No diagnostic tests performed  Treatments  No previous or current treatments      Objective     Postural Observations  Seated posture: fair      Palpation   Left   Hypertonic in the cervical interspinals, levator scapulae and upper trapezius.     Right   Hypertonic in the cervical interspinals, levator scapulae and upper trapezius.   Tenderness of the cervical interspinals, levator scapulae and upper trapezius.     Active Range of Motion   Cervical/Thoracic Spine       Cervical    Flexion: 45 degrees  Restriction level: moderate  Extension: 50 degrees     Restriction level: moderate  Left lateral flexion: 20 degrees      Right lateral flexion: 25 degrees      Left rotation: 58 degrees  Right rotation: 78 degrees    with pain    Thoracic    Flexion:  WFL  Extension:  Restriction level: maximal  Left lateral flexion:  Restriction level: minimal  Right lateral flexion:  Restriction level: minimal  Left rotation:  Restriction level: maximal  Right rotation:  Restriction level: maximal    Strength/Myotome Testing     Left Shoulder     Planes of Motion   Flexion: 4   Abduction: 4     Right Shoulder     Planes of Motion   Flexion: 5   Abduction: 5     Left Elbow   Flexion: 5    Right Elbow   Flexion: 5    Left Wrist/Hand   Thumb extension: 5    Right Wrist/Hand   Thumb extension: 5          Daily Treatment Diary     Precautions: incomplete tear of L RTC (as per pt)    POC Expires Reeval for Medicare to be completed  Unit Limit Auth Expiration Date PT/OT/STVisit Limit   9/19/25 By visit 10 N/A N/A N/A    Completed on visit N/A                   Auth Status DATE 6/19        N/A Visit # 1          Remaining 9        MANUAL THERAPY         STM/MFR         Manual flexibility         Joint mobs thoracic                                    THERAPEUTIC EXERCISE HEP         Cervical AROM          Seated thoracic extension x 5x5s chair w/ 1/2 FR        Open book          Cervical SNAG ext          Cervical SNAG rot           Doorway stretch                                                                                          Pt education          NEUROMUSCULAR REEDUCATION           Cervical retractions x 5x5s        Scap retractions x 5x5s        Rows          Shoulder extensions                                                                                                              THERAPEUTIC ACTIVITY                                                  MODALITIES                                  Access Code: [email or web address]  URL: [email or web address]  Date: 06/19/2025  Prepared by: Rivas Garner    Exercises  - Seated Cervical Retraction  - 2-3 x daily - 7 x weekly - 2 sets - 10 reps - 2 hold  - Seated Scapular Retraction  - 2-3 x daily - 7 x weekly - 2 sets - 10 reps - 3 hold  - Seated Thoracic Extension with Hands Supporting Neck  - 2-3 x daily - 7 x weekly - 1 sets - 10 reps - 5 hold

## 2025-06-19 NOTE — LETTER
2025    Hiral Bunn MD  [address] [facility] [place], [place] [id number]    Patient: Ofelia Connor   YOB: 1945   Date of Visit: 2025     Encounter Diagnosis     ICD-10-CM    1. Upper back strain, subsequent encounter  S29.012D           Dear Dr. Hiral Bunn MD:    Thank you for your recent referral of Ofelia Connor. Please review the attached evaluation summary from Ofelia's recent visit.     Please verify that you agree with the plan of care by signing the attached order.     If you have any questions or concerns, please do not hesitate to call.     I sincerely appreciate the opportunity to share in the care of one of your patients and hope to have another opportunity to work with you in the near future.       Sincerely,    Rivas Garner, PT      Referring Provider:      I certify that I have read the below Plan of Care and certify the need for these services furnished under this plan of treatment while under my care.                    Hiral Bunn MD  [facility]. [address] [place] [id number]  Via Fax: [phone]          PT Evaluation     Today's date: 2025  Patient name: Ofelia Connor  : 1945  MRN: [id number]  Referring provider: Hiral Bunn,*  Dx:   Encounter Diagnosis     ICD-10-CM    1. Upper back strain, subsequent encounter  S29.012D                      Assessment  Impairments: abnormal muscle firing, abnormal or restricted ROM, activity intolerance, impaired physical strength, lacks appropriate home exercise program, pain with function, scapular dyskinesis, poor posture  and poor body mechanics    Assessment details: Ofelia Connor is a 79 y.o. female who presents to hospital-based outpatient PT with posterior neck and upper back pain. Patient presents with the following impairments: neck pain, increased tissue tension of R UT/LS, cervical paraspinals, and abnormal posture. Due to these impairments,  patient has difficulty performing the following: looking up, looking down, turning head right, lying supine, and sleeping. Patient has been educated in home exercise program and plan of care. Patient would benefit from skilled physical therapy services to address the above functional limitations and progress towards prior level of function and independence with home exercise program.  Understanding of Dx/Px/POC: good     Prognosis: good    Goals  Short Term Goals [3 weeks; target date: 7/31/25]  1. Patient will be independent with initial HEP.  2. Patient will be able to self-correct posture in the clinic 50% of the time or greater.    Long Term Goals [8 weeks; target date: 9/19/25]  1. Patient will demonstrate an increase in cervical AROM to WNL in order to promote self-care activities pain-free.  2. Patient will present without sleep interruption on 5 out of 7 nights due to neck/back pain.   3. Patient will be able to self-correct posture in the clinic 75% of the time or greater.  4. Patient will be independent with comprehensive HEP.  5. Patient will return to [facility] 4x/week with neck/back pain of 2/10 at worst.     Plan  Patient would benefit from: skilled physical therapy  Planned modality interventions: cryotherapy, electrical stimulation/Russian stimulation, TENS, ultrasound, thermotherapy: hydrocollator packs, traction, high voltage pulsed current: spasm management and high voltage pulsed current: pain management    Planned therapy interventions: flexibility, functional ROM exercises, graded exercise, home exercise program, joint mobilization, manual therapy, neuromuscular re-education, patient education, strengthening, stretching, therapeutic exercise, therapeutic activities, balance/weight bearing training, gait training, abdominal trunk stabilization, self care, postural training, activity modification, ADL retraining, ADL training, balance, behavior modification, body mechanics training, breathing  training, therapeutic training, transfer training, graded activity, graded motor, muscle pump exercises, Brady taping and IADL retraining    Frequency: 1-2x week  Plan of Care beginning date: 2025  Plan of Care expiration date: 2025  Treatment plan discussed with: patient    Subjective Evaluation    History of Present Illness  Mechanism of injury: Pt reports tightness in the muscles of the back neck over the past 1.5 months. Pt denies a specific incident that started this. Pt states that she may attribute this to walking while looking down. Pt states that she has also gotten a Sleep Number bed in May, which has helped. Pt states that she has interrupted sleep and pain at night. Pt states that she also has restriction in turning her head. Pt states the pain is located on the right side of the neck, but denies pain in the arm or headaches. Pt states she mentioned this to her PCP during an annual physical and was referred to PT.  Patient Goals  Patient goal: To get muscles to relax and less tight  Pain  Current pain ratin  At best pain ratin  At worst pain ratin  Location: Upper thoracic  Quality: tight  Relieving factors: medications  Aggravating factors: lifting (Walking uphill while holding a weighted bag)  Progression: no change    Social Support  Lives with: spouse    Employment status: not working  Hand dominance: right  Exercise history: Jazzercise 4x/week prior; less recently      Diagnostic Tests  No diagnostic tests performed  Treatments  No previous or current treatments      Objective     Postural Observations  Seated posture: fair      Palpation   Left   Hypertonic in the cervical interspinals, levator scapulae and upper trapezius.     Right   Hypertonic in the cervical interspinals, levator scapulae and upper trapezius.   Tenderness of the cervical interspinals, levator scapulae and upper trapezius.     Active Range of Motion   Cervical/Thoracic Spine       Cervical    Flexion:  45 degrees  Restriction level: moderate  Extension: 50 degrees     Restriction level: moderate  Left lateral flexion: 20 degrees      Right lateral flexion: 25 degrees      Left rotation: 58 degrees  Right rotation: 78 degrees    with pain    Thoracic    Flexion:  WFL  Extension:  Restriction level: maximal  Left lateral flexion:  Restriction level: minimal  Right lateral flexion:  Restriction level: minimal  Left rotation:  Restriction level: maximal  Right rotation:  Restriction level: maximal    Strength/Myotome Testing     Left Shoulder     Planes of Motion   Flexion: 4   Abduction: 4     Right Shoulder     Planes of Motion   Flexion: 5   Abduction: 5     Left Elbow   Flexion: 5    Right Elbow   Flexion: 5    Left Wrist/Hand   Thumb extension: 5    Right Wrist/Hand   Thumb extension: 5          Daily Treatment Diary     Precautions: incomplete tear of L RTC (as per pt)    POC Expires Reeval for Medicare to be completed  Unit Limit Auth Expiration Date PT/OT/STVisit Limit   9/19/25 By visit 10 N/A N/A N/A    Completed on visit N/A                   Auth Status DATE 6/19        N/A Visit # 1         Remaining 9        MANUAL THERAPY         STM/MFR         Manual flexibility         Joint mobs thoracic                                    THERAPEUTIC EXERCISE HEP         Cervical AROM          Seated thoracic extension x 5x5s chair w/ 1/2 FR        Open book          Cervical SNAG ext          Cervical SNAG rot           Doorway stretch                                                                                          Pt education          NEUROMUSCULAR REEDUCATION           Cervical retractions x 5x5s        Scap retractions x 5x5s        Rows          Shoulder extensions                                                                                                              THERAPEUTIC ACTIVITY                                                  MODALITIES                                  Access Code:  [email or web address]  URL: [email or web address]  Date: 06/19/2025  Prepared by: Rivas Garner    Exercises  - Seated Cervical Retraction  - 2-3 x daily - 7 x weekly - 2 sets - 10 reps - 2 hold  - Seated Scapular Retraction  - 2-3 x daily - 7 x weekly - 2 sets - 10 reps - 3 hold  - Seated Thoracic Extension with Hands Supporting Neck  - 2-3 x daily - 7 x weekly - 1 sets - 10 reps - 5 hold

## 2025-07-01 ENCOUNTER — OFFICE VISIT (OUTPATIENT)
Dept: PHYSICAL THERAPY | Facility: CLINIC | Age: 80
End: 2025-07-01
Payer: MEDICARE

## 2025-07-01 DIAGNOSIS — S29.012D UPPER BACK STRAIN, SUBSEQUENT ENCOUNTER: Primary | ICD-10-CM

## 2025-07-01 PROCEDURE — 97110 THERAPEUTIC EXERCISES: CPT

## 2025-07-01 PROCEDURE — 97112 NEUROMUSCULAR REEDUCATION: CPT

## 2025-07-01 PROCEDURE — 97140 MANUAL THERAPY 1/> REGIONS: CPT

## 2025-07-01 NOTE — PROGRESS NOTES
"Daily Note     Today's date: 2025  Patient name: Ofelia Connor  : 1945  MRN: [id number]  Referring provider: Hiral Bunn,*  Dx:   Encounter Diagnosis     ICD-10-CM    1. Upper back strain, subsequent encounter  S29.012D                      Subjective: Ofelia reports upper/mid back discomfort has improved some but still bothersome. Notes most \"tenderness\" and and stiffness on L side compared to R side.       Objective: See treatment diary below      Assessment: Ofelia tolerated PT treatment well. STM focusing along cervical and thoracic musculature to address tissue tone. Increased tone noted along B/L UT, rhomboids, and thoracic paraspinals. Added SNAGS, open book, and door ways stretch to address spine mobility and forward flexed posture. Frequent cueing to maintain proper technique. Pt would benefit from continued PT to further address impairments and maximize functional level.       Plan: Continue per plan of care.      Daily Treatment Diary     Precautions: incomplete tear of L RTC (as per pt)    POC Expires Reeval for Medicare to be completed  Unit Limit Auth Expiration Date PT/OT/STVisit Limit   25 By visit 10 N/A N/A N/A    Completed on visit N/A                   Auth Status DATE        N/A Visit # 1 2        Remaining 9 8       MANUAL THERAPY         STM/MFR  JW       Manual flexibility         Joint mobs thoracic                                    THERAPEUTIC EXERCISE HEP         Cervical AROM          Seated thoracic extension x 5x5s chair w/ 1/2 FR 2x10 5s 1/2 FR       Open book   X10 ea        Cervical SNAG ext   X10 ea 5s       Cervical SNAG rot    X10 ea 5s       Doorway stretch   2x30s                                                                                        Pt education          NEUROMUSCULAR REEDUCATION           Cervical retractions x 5x5s X10 5s       Scap retractions x 5x5s X10 5s       Rows          Shoulder extensions                                 "                                                                              THERAPEUTIC ACTIVITY                                                  MODALITIES                                  Access Code: [email or web address]  URL: [email or web address]  Date: 06/19/2025  Prepared by: Rivas Garner    Exercises  - Seated Cervical Retraction  - 2-3 x daily - 7 x weekly - 2 sets - 10 reps - 2 hold  - Seated Scapular Retraction  - 2-3 x daily - 7 x weekly - 2 sets - 10 reps - 3 hold  - Seated Thoracic Extension with Hands Supporting Neck  - 2-3 x daily - 7 x weekly - 1 sets - 10 reps - 5 hold

## 2025-07-08 ENCOUNTER — OFFICE VISIT (OUTPATIENT)
Dept: PHYSICAL THERAPY | Facility: CLINIC | Age: 80
End: 2025-07-08
Payer: MEDICARE

## 2025-07-08 DIAGNOSIS — S29.012D UPPER BACK STRAIN, SUBSEQUENT ENCOUNTER: Primary | ICD-10-CM

## 2025-07-08 PROCEDURE — 97140 MANUAL THERAPY 1/> REGIONS: CPT

## 2025-07-08 PROCEDURE — 97112 NEUROMUSCULAR REEDUCATION: CPT

## 2025-07-08 PROCEDURE — 97110 THERAPEUTIC EXERCISES: CPT

## 2025-07-08 NOTE — PROGRESS NOTES
Daily Note      Today's date: 2025  Patient name: Ofelia Connor  : 1945  MRN: [id number]  Referring provider: Hiral Bunn,*  Dx:   Encounter Diagnosis     ICD-10-CM    1. Upper back strain, subsequent encounter  S29.012D           Subjective: Pt reports that her neck muscles feel tight.        Objective: See treatment diary below    Assessment: Pt tolerated treatment well. Pt progressed to band-resisted rows and shoulder extensions and was able to progress resistance with rows. Pt presented with hypertonicity in B/L UT, LS, and cervical paraspinals. Pt noted symmetrical TTP and presented with symmetrical increase in tone.     Plan: Continue per plan of care.         Daily Treatment Diary     Precautions: incomplete tear of L RTC (as per pt)    POC Expires Reeval for Medicare to be completed  Unit Limit Auth Expiration Date PT/OT/STVisit Limit   25 By visit 10 N/A N/A N/A    Completed on visit N/A                   Auth Status DATE  7/      N/A Visit # 1 2 3       Remaining 9 8 7      MANUAL THERAPY         STM/MFR  JW UT/LS, cervical paraspinals B/L       Manual flexibility         Joint mobs thoracic                                    THERAPEUTIC EXERCISE HEP         Cervical AROM          Seated thoracic extension x 5x5s chair w/ 1/2 FR 2x10 5s 1/2 FR 2x10 (5s) 1/2 FR      Open book   X10 ea  10x5s ea       Cervical SNAG ext   X10 ea 5s 10x5s       Cervical SNAG rot    X10 ea 5s 10x5s ea      Doorway stretch   2x30s  3x30s                                                                                       Pt education          NEUROMUSCULAR REEDUCATION           Cervical retractions x 5x5s X10 5s 2x10 (5s)       Scap retractions x 5x5s X10 5s 2x10 (5s)       Rows    2x10 blue TB      Shoulder extensions    2x10 OTB                                                                                                           THERAPEUTIC ACTIVITY                                                   MODALITIES                                  Access Code: [email or web address]  URL: [email or web address]  Date: 06/19/2025  Prepared by: Rivas Garner    Exercises  - Seated Cervical Retraction  - 2-3 x daily - 7 x weekly - 2 sets - 10 reps - 2 hold  - Seated Scapular Retraction  - 2-3 x daily - 7 x weekly - 2 sets - 10 reps - 3 hold  - Seated Thoracic Extension with Hands Supporting Neck  - 2-3 x daily - 7 x weekly - 1 sets - 10 reps - 5 hold

## 2025-07-10 ENCOUNTER — OFFICE VISIT (OUTPATIENT)
Dept: PHYSICAL THERAPY | Facility: CLINIC | Age: 80
End: 2025-07-10
Payer: MEDICARE

## 2025-07-10 DIAGNOSIS — S29.012D UPPER BACK STRAIN, SUBSEQUENT ENCOUNTER: Primary | ICD-10-CM

## 2025-07-10 PROCEDURE — 97110 THERAPEUTIC EXERCISES: CPT

## 2025-07-10 PROCEDURE — 97112 NEUROMUSCULAR REEDUCATION: CPT

## 2025-07-10 PROCEDURE — 97140 MANUAL THERAPY 1/> REGIONS: CPT

## 2025-07-10 NOTE — PROGRESS NOTES
"Daily Note      Today's date: 7/10/2025  Patient name: Ofelia Connor  : 1945  MRN: [id number]  Referring provider: Hiral Bunn,*  Dx:   Encounter Diagnosis     ICD-10-CM    1. Upper back strain, subsequent encounter  S29.012D           Subjective: Ofelia reports cervical stiffness persists, mostly in B/L UT region. Notes B/L shoulders \"ached\" last night while lying in bed. Pt has been participating in workout class a few times a week, modified UE strength training as needed.        Objective: See treatment diary below    Assessment: Ofelia tolerated PT treatment well. Initiated session with MH to cervical musculature. Pt continues to benefit from STM to B/L UT/LS and paraspinals to address tissue mobility, significant tone noted throughout. Frequent verbal and tactile cueing required throughout therapeutic exercises to ensure proper exercise technique. Observed poor postural habits with forward flexed posture throughout seated and standing activities. Pt would benefit from continued PT to further address impairments and maximize functional level.       Plan: Continue per plan of care.         Daily Treatment Diary     Precautions: incomplete tear of L RTC (as per pt)    POC Expires Reeval for Medicare to be completed  Unit Limit Auth Expiration Date PT/OT/STVisit Limit   25 By visit 10 N/A N/A N/A    Completed on visit N/A                   Auth Status DATE  7/8 7/10     N/A Visit # 1 2 3 4      Remaining 9 8 7 6     MANUAL THERAPY         STM/MFR  JW UT/LS, cervical paraspinals B/L  UT/LS, cervical paraspinals B/L  JW     Manual flexibility         Joint mobs thoracic                                    THERAPEUTIC EXERCISE HEP         Cervical AROM          Seated thoracic extension x 5x5s chair w/ 1/2 FR 2x10 5s 1/2 FR 2x10 (5s) 1/2 FR 2x10 (5s) 1/2 FR     Open book   X10 ea  10x5s ea  10x5s ea      Cervical SNAG ext   X10 ea 5s 10x5s  10x5s      Cervical SNAG rot    X10 ea 5s 10x5s ea " 10x5s ea     Doorway stretch   2x30s  3x30s  3x30s                                                                                      Pt education          NEUROMUSCULAR REEDUCATION           Cervical retractions x 5x5s X10 5s 2x10 (5s)  2x10 (5s)      Scap retractions x 5x5s X10 5s 2x10 (5s)  2x10 (5s)      Rows    2x10 blue TB BTB 2x10      Shoulder extensions    2x10 OTB  OTB 2x10                                                                                                          THERAPEUTIC ACTIVITY                                                  MODALITIES                                  Access Code: [email or web address]  URL: [email or web address]  Date: 06/19/2025  Prepared by: Rivas Garner    Exercises  - Seated Cervical Retraction  - 2-3 x daily - 7 x weekly - 2 sets - 10 reps - 2 hold  - Seated Scapular Retraction  - 2-3 x daily - 7 x weekly - 2 sets - 10 reps - 3 hold  - Seated Thoracic Extension with Hands Supporting Neck  - 2-3 x daily - 7 x weekly - 1 sets - 10 reps - 5 hold

## 2025-07-17 ENCOUNTER — OFFICE VISIT (OUTPATIENT)
Dept: PHYSICAL THERAPY | Facility: CLINIC | Age: 80
End: 2025-07-17
Payer: MEDICARE

## 2025-07-17 DIAGNOSIS — S29.012D UPPER BACK STRAIN, SUBSEQUENT ENCOUNTER: Primary | ICD-10-CM

## 2025-07-17 PROCEDURE — 97112 NEUROMUSCULAR REEDUCATION: CPT

## 2025-07-17 PROCEDURE — 97140 MANUAL THERAPY 1/> REGIONS: CPT

## 2025-07-17 NOTE — PROGRESS NOTES
"Daily Note      Today's date: 2025  Patient name: Ofelia Connor  : 1945  MRN: [id number]  Referring provider: Hiral Bunn,*  Dx:   Encounter Diagnosis     ICD-10-CM    1. Upper back strain, subsequent encounter  S29.012D           Subjective: Ofelia reports cervical discomfort \"comes and goes\" throughout daily activities. Notes sleeping is still very difficult d/t inability to find a comfortable position that does not cause B/L shoulder and neck aggravation after a few hours.      Objective: See treatment diary below    Assessment: Ofelia tolerated PT treatment well. Initiated session with MH to cervical musculature. STM focusing along B/L UT/LS and paraspinals to address tissue tone, pt favoring. Does continue to rely on cueing throughout therapeutic exercises for proper technique. TB postural strengthening appropriate. Pt would benefit from continued PT to further address impairments and maximize functional level.       Plan: Continue per plan of care.         Daily Treatment Diary     Precautions: incomplete tear of L RTC (as per pt)    POC Expires Reeval for Medicare to be completed  Unit Limit Auth Expiration Date PT/OT/STVisit Limit   25 By visit 10 N/A N/A N/A    Completed on visit N/A                   Auth Status DATE  7/8 7/10 7/17    N/A Visit # 1 2 3 4 5     Remaining 9 8 7 6 5    MANUAL THERAPY         STM/MFR  JW UT/LS, cervical paraspinals B/L  UT/LS, cervical paraspinals B/L  JW UT/LS, cervical paraspinals B/L  JW    Manual flexibility         Joint mobs thoracic                                    THERAPEUTIC EXERCISE HEP         Cervical AROM          Seated thoracic extension x 5x5s chair w/ 1/2 FR 2x10 5s 1/2 FR 2x10 (5s) 1/2 FR 2x10 (5s) 1/2 FR 2x10 (5s) 1/2 FR    Open book   X10 ea  10x5s ea  10x5s ea      Cervical SNAG ext   X10 ea 5s 10x5s  10x5s  2x10 5s    Cervical SNAG rot    X10 ea 5s 10x5s ea 10x5s ea 10x5s     Doorway stretch   2x30s  3x30s  3x30s  3x30s  "                                                                                   Pt education          NEUROMUSCULAR REEDUCATION           Cervical retractions x 5x5s X10 5s 2x10 (5s)  2x10 (5s)  2x10 (5s)     Scap retractions x 5x5s X10 5s 2x10 (5s)  2x10 (5s)  2x10 (5s)     Rows    2x10 blue TB BTB 2x10  BTB 2x10     Shoulder extensions    2x10 OTB  OTB 2x10  OTB 2x10                                                                                                        THERAPEUTIC ACTIVITY                                                  MODALITIES                                  Access Code: [email or web address]  URL: [email or web address]  Date: 06/19/2025  Prepared by: Rivas Garner    Exercises  - Seated Cervical Retraction  - 2-3 x daily - 7 x weekly - 2 sets - 10 reps - 2 hold  - Seated Scapular Retraction  - 2-3 x daily - 7 x weekly - 2 sets - 10 reps - 3 hold  - Seated Thoracic Extension with Hands Supporting Neck  - 2-3 x daily - 7 x weekly - 1 sets - 10 reps - 5 hold

## 2025-07-21 ENCOUNTER — OFFICE VISIT (OUTPATIENT)
Dept: PHYSICAL THERAPY | Facility: CLINIC | Age: 80
End: 2025-07-21
Payer: MEDICARE

## 2025-07-21 DIAGNOSIS — S29.012D UPPER BACK STRAIN, SUBSEQUENT ENCOUNTER: Primary | ICD-10-CM

## 2025-07-21 PROCEDURE — 97110 THERAPEUTIC EXERCISES: CPT

## 2025-07-21 PROCEDURE — 97112 NEUROMUSCULAR REEDUCATION: CPT

## 2025-07-21 PROCEDURE — 97140 MANUAL THERAPY 1/> REGIONS: CPT

## 2025-07-21 NOTE — PROGRESS NOTES
"Daily Note      Today's date: 2025  Patient name: Ofelia Connor  : 1945  MRN: [id number]  Referring provider: Hiral Bunn,*  Dx:   Encounter Diagnosis     ICD-10-CM    1. Upper back strain, subsequent encounter  S29.012D           Subjective: Ofelia reports majority of cervical and shoulder discomfort occurring at night, \"it just aches and feels very stiff\". Notes she has been diligent with HEP and utilizing HP at home. Also notes neck felt looser following jazzercise class this morning.     Objective: See treatment diary below    Assessment: Ofelia tolerated PT treatment well. Initiated session with MH to cervical spine with mobility exercises. Added B/L UT stretch, limited flexibility R>L. STM performed to cervical musculature to address tissue tone, pt favoring. Addressed posture with doorway stretch and TB periscapular strengthening. Pt would benefit from continued PT to further address impairments and maximize functional level.       Plan: Continue per plan of care.         Daily Treatment Diary     Precautions: incomplete tear of L RTC (as per pt)    POC Expires Reeval for Medicare to be completed  Unit Limit Auth Expiration Date PT/OT/STVisit Limit   25 By visit 10 N/A N/A N/A    Completed on visit N/A                   Auth Status DATE 6/19 7/1 7/8 7/10 7/17 7/21   N/A Visit # 1 2 3 4 5 6    Remaining 9 8 7 6 5 4    MANUAL THERAPY         STM/MFR  JW UT/LS, cervical paraspinals B/L  UT/LS, cervical paraspinals B/L  JW UT/LS, cervical paraspinals B/L  JW UT/LS, cervical paraspinals B/L  JW   Manual flexibility         Joint mobs thoracic                                    THERAPEUTIC EXERCISE HEP         Cervical AROM          Seated thoracic extension x 5x5s chair w/ 1/2 FR 2x10 5s 1/2 FR 2x10 (5s) 1/2 FR 2x10 (5s) 1/2 FR 2x10 (5s) 1/2 FR 2x10 (5s) 1/2 FR   Open book   X10 ea  10x5s ea  10x5s ea      Cervical SNAG ext   X10 ea 5s 10x5s  10x5s  2x10 5s 2x10 5s   Cervical SNAG rot    X10 " ea 5s 10x5s ea 10x5s ea 10x5s  10x5s    Doorway stretch   2x30s  3x30s  3x30s  3x30s 3x30s                                                                                   Pt education          NEUROMUSCULAR REEDUCATION           Cervical retractions x 5x5s X10 5s 2x10 (5s)  2x10 (5s)  2x10 (5s)  2x10 (5s)    Scap retractions x 5x5s X10 5s 2x10 (5s)  2x10 (5s)  2x10 (5s)  2x10 (5s)    Rows    2x10 blue TB BTB 2x10  BTB 2x10  BTB 2x10    Shoulder extensions    2x10 OTB  OTB 2x10  OTB 2x10 GTB 2x10                                                                                                        THERAPEUTIC ACTIVITY                                                  MODALITIES                                  Access Code: [email or web address]  URL: [email or web address]  Date: 06/19/2025  Prepared by: Rivas Garner    Exercises  - Seated Cervical Retraction  - 2-3 x daily - 7 x weekly - 2 sets - 10 reps - 2 hold  - Seated Scapular Retraction  - 2-3 x daily - 7 x weekly - 2 sets - 10 reps - 3 hold  - Seated Thoracic Extension with Hands Supporting Neck  - 2-3 x daily - 7 x weekly - 1 sets - 10 reps - 5 hold

## 2025-07-24 ENCOUNTER — OFFICE VISIT (OUTPATIENT)
Dept: PHYSICAL THERAPY | Facility: CLINIC | Age: 80
End: 2025-07-24
Payer: MEDICARE

## 2025-07-24 DIAGNOSIS — S29.012D UPPER BACK STRAIN, SUBSEQUENT ENCOUNTER: Primary | ICD-10-CM

## 2025-07-24 PROCEDURE — 97110 THERAPEUTIC EXERCISES: CPT

## 2025-07-24 PROCEDURE — 97112 NEUROMUSCULAR REEDUCATION: CPT

## 2025-07-24 PROCEDURE — 97140 MANUAL THERAPY 1/> REGIONS: CPT

## 2025-07-24 NOTE — PROGRESS NOTES
Daily Note      Today's date: 2025  Patient name: Ofelia Connor  : 1945  MRN: [id number]  Referring provider: Hiral Bunn,*  Dx:   Encounter Diagnosis     ICD-10-CM    1. Upper back strain, subsequent encounter  S29.012D           Subjective: Pt reports that she has tightness on the left side of her neck of 2/10. Pt states that neck pain wakes her up out of her sleep and is 5/10 at the time.        Objective: See treatment diary below    Assessment: Pt tolerated treatment well. Pt responded well to MHP at the start of the session. Pt demonstrates good carryover of her exercise program to date. Pt trialed manual therapy in supine this visit and tolerated well.     Plan: Continue per plan of care.         Daily Treatment Diary     Precautions: incomplete tear of L RTC (as per pt)    POC Expires Reeval for Medicare to be completed  Unit Limit Auth Expiration Date PT/OT/STVisit Limit   25 By visit 10 N/A N/A N/A    Completed on visit N/A                   Auth Status DATE       N/A Visit # 5 6 7       Remaining 5 4  3      MANUAL THERAPY         STM/MFR UT/LS, cervical paraspinals B/L  JW UT/LS, cervical paraspinals B/L  JW DJA manual SOR w/ cervical paraspinals      Manual flexibility         Joint mobs thoracic                                    THERAPEUTIC EXERCISE HEP         Cervical AROM          Seated thoracic extension x 2x10 (5s) 1/2 FR 2x10 (5s) 1/2 FR 2x10 (5s) 1/2 FR       Open book          Cervical SNAG ext  2x10 5s 2x10 5s 10x5s       Cervical SNAG rot   10x5s  10x5s  10x5s      Doorway stretch  3x30s 3x30s                                                                                       Pt education          NEUROMUSCULAR REEDUCATION           Cervical retractions x 2x10 (5s)  2x10 (5s)  2x10 (5s) w/ MHP      Scap retractions x 2x10 (5s)  2x10 (5s)  2x10 (5s) w/ MHP       Rows  BTB 2x10  BTB 2x10  2x10 blue TB      Shoulder extensions  OTB 2x10 GTB 2x10                                                                                                             THERAPEUTIC ACTIVITY                                                  MODALITIES         MHP cervical   5' pre-tx, 5' during exercises                      Access Code: [email or web address]  URL: [email or web address]  Date: 06/19/2025  Prepared by: Rivas Garner    Exercises  - Seated Cervical Retraction  - 2-3 x daily - 7 x weekly - 2 sets - 10 reps - 2 hold  - Seated Scapular Retraction  - 2-3 x daily - 7 x weekly - 2 sets - 10 reps - 3 hold  - Seated Thoracic Extension with Hands Supporting Neck  - 2-3 x daily - 7 x weekly - 1 sets - 10 reps - 5 hold

## 2025-07-29 ENCOUNTER — OFFICE VISIT (OUTPATIENT)
Dept: PHYSICAL THERAPY | Facility: CLINIC | Age: 80
End: 2025-07-29
Payer: MEDICARE

## 2025-07-29 DIAGNOSIS — S29.012D UPPER BACK STRAIN, SUBSEQUENT ENCOUNTER: Primary | ICD-10-CM

## 2025-07-29 PROCEDURE — 97110 THERAPEUTIC EXERCISES: CPT

## 2025-07-29 PROCEDURE — 97112 NEUROMUSCULAR REEDUCATION: CPT

## 2025-07-29 PROCEDURE — 97140 MANUAL THERAPY 1/> REGIONS: CPT

## 2025-07-31 ENCOUNTER — OFFICE VISIT (OUTPATIENT)
Dept: PHYSICAL THERAPY | Facility: CLINIC | Age: 80
End: 2025-07-31
Payer: MEDICARE

## 2025-07-31 DIAGNOSIS — S29.012D UPPER BACK STRAIN, SUBSEQUENT ENCOUNTER: Primary | ICD-10-CM

## 2025-07-31 PROCEDURE — 97110 THERAPEUTIC EXERCISES: CPT

## 2025-07-31 PROCEDURE — 97112 NEUROMUSCULAR REEDUCATION: CPT

## 2025-07-31 PROCEDURE — 97140 MANUAL THERAPY 1/> REGIONS: CPT

## 2025-08-12 ENCOUNTER — EVALUATION (OUTPATIENT)
Dept: PHYSICAL THERAPY | Facility: CLINIC | Age: 80
End: 2025-08-12
Payer: MEDICARE

## 2025-08-14 ENCOUNTER — OFFICE VISIT (OUTPATIENT)
Dept: PHYSICAL THERAPY | Facility: CLINIC | Age: 80
End: 2025-08-14
Payer: MEDICARE

## 2025-08-18 ENCOUNTER — OFFICE VISIT (OUTPATIENT)
Dept: PHYSICAL THERAPY | Facility: CLINIC | Age: 80
End: 2025-08-18
Payer: MEDICARE

## 2025-08-18 DIAGNOSIS — S29.012D UPPER BACK STRAIN, SUBSEQUENT ENCOUNTER: Primary | ICD-10-CM

## 2025-08-18 PROCEDURE — 97112 NEUROMUSCULAR REEDUCATION: CPT

## 2025-08-18 PROCEDURE — 97140 MANUAL THERAPY 1/> REGIONS: CPT

## 2025-08-18 PROCEDURE — 97110 THERAPEUTIC EXERCISES: CPT

## 2025-08-20 ENCOUNTER — OFFICE VISIT (OUTPATIENT)
Dept: PHYSICAL THERAPY | Facility: CLINIC | Age: 80
End: 2025-08-20
Payer: MEDICARE

## 2025-08-20 DIAGNOSIS — S29.012D UPPER BACK STRAIN, SUBSEQUENT ENCOUNTER: Primary | ICD-10-CM

## 2025-08-20 PROCEDURE — 97140 MANUAL THERAPY 1/> REGIONS: CPT

## 2025-08-20 PROCEDURE — 97110 THERAPEUTIC EXERCISES: CPT

## 2025-08-20 PROCEDURE — 97112 NEUROMUSCULAR REEDUCATION: CPT
